# Patient Record
Sex: FEMALE | Race: WHITE | NOT HISPANIC OR LATINO | URBAN - METROPOLITAN AREA
[De-identification: names, ages, dates, MRNs, and addresses within clinical notes are randomized per-mention and may not be internally consistent; named-entity substitution may affect disease eponyms.]

---

## 2022-07-09 ENCOUNTER — TELEPHONE ENCOUNTER (OUTPATIENT)
Dept: URBAN - METROPOLITAN AREA CLINIC 121 | Facility: CLINIC | Age: 72
End: 2022-07-09

## 2022-07-10 ENCOUNTER — TELEPHONE ENCOUNTER (OUTPATIENT)
Dept: URBAN - METROPOLITAN AREA CLINIC 121 | Facility: CLINIC | Age: 72
End: 2022-07-10

## 2022-07-30 ENCOUNTER — TELEPHONE ENCOUNTER (OUTPATIENT)
Age: 72
End: 2022-07-30

## 2022-07-30 RX ORDER — DEXLANSOPRAZOLE 30 MG/1
1 (ONE) CAPSULE, DELAYED RELEASE ORAL DAILY
Qty: 0 | Refills: 2 | OUTPATIENT
Start: 2019-11-14 | End: 2019-12-03

## 2022-07-30 RX ORDER — WHEAT DEXTRIN 3 G/4 G
1 (ONE) POWDER (GRAM) ORAL TWICE DAILY
Qty: 0 | Refills: 2 | OUTPATIENT
Start: 2019-11-07 | End: 2019-11-14

## 2022-07-30 RX ORDER — LORATADINE 5 MG
17 TABLET,CHEWABLE ORAL
Qty: 0 | Refills: 2 | OUTPATIENT
Start: 2019-12-03 | End: 2020-01-02

## 2022-07-30 RX ORDER — LORATADINE 5 MG
17 TABLET,CHEWABLE ORAL
Qty: 0 | Refills: 2 | OUTPATIENT
Start: 2019-11-14 | End: 2019-12-03

## 2022-07-31 ENCOUNTER — TELEPHONE ENCOUNTER (OUTPATIENT)
Age: 72
End: 2022-07-31

## 2022-07-31 RX ORDER — LORATADINE 5 MG
17 TABLET,CHEWABLE ORAL
Qty: 0 | Refills: 2 | Status: ACTIVE | COMMUNITY
Start: 2019-11-14

## 2022-07-31 RX ORDER — DEXLANSOPRAZOLE 30 MG/1
1 (ONE) CAPSULE, DELAYED RELEASE ORAL DAILY
Qty: 0 | Refills: 2 | Status: ACTIVE | COMMUNITY
Start: 2019-11-14

## 2022-07-31 RX ORDER — LORATADINE 5 MG
17 TABLET,CHEWABLE ORAL
Qty: 0 | Refills: 2 | Status: ACTIVE | COMMUNITY
Start: 2019-12-03

## 2022-07-31 RX ORDER — WHEAT DEXTRIN 3 G/4 G
1 (ONE) POWDER (GRAM) ORAL TWICE DAILY
Qty: 0 | Refills: 2 | Status: ACTIVE | COMMUNITY
Start: 2019-11-07

## 2023-10-10 ENCOUNTER — TELEPHONE ENCOUNTER (OUTPATIENT)
Dept: URBAN - METROPOLITAN AREA CLINIC 7 | Facility: CLINIC | Age: 73
End: 2023-10-10

## 2023-10-12 ENCOUNTER — OFFICE VISIT (OUTPATIENT)
Dept: URBAN - METROPOLITAN AREA CLINIC 9 | Facility: CLINIC | Age: 73
End: 2023-10-12
Payer: MEDICARE

## 2023-10-12 VITALS
DIASTOLIC BLOOD PRESSURE: 76 MMHG | WEIGHT: 139 LBS | BODY MASS INDEX: 21.82 KG/M2 | HEIGHT: 67 IN | SYSTOLIC BLOOD PRESSURE: 128 MMHG

## 2023-10-12 DIAGNOSIS — R19.8 CHANGE IN BOWEL MOVEMENT: ICD-10-CM

## 2023-10-12 DIAGNOSIS — R19.5 LOOSE STOOLS: ICD-10-CM

## 2023-10-12 DIAGNOSIS — K44.9 HIATAL HERNIA: ICD-10-CM

## 2023-10-12 DIAGNOSIS — K21.9 CHRONIC GERD: ICD-10-CM

## 2023-10-12 DIAGNOSIS — K76.0 HEPATIC STEATOSIS: ICD-10-CM

## 2023-10-12 PROBLEM — 197321007: Status: ACTIVE | Noted: 2023-10-12

## 2023-10-12 PROBLEM — 88111009: Status: ACTIVE | Noted: 2023-10-12

## 2023-10-12 PROBLEM — 235595009: Status: ACTIVE | Noted: 2023-10-12

## 2023-10-12 PROBLEM — 84089009: Status: ACTIVE | Noted: 2023-10-12

## 2023-10-12 PROBLEM — 398032003: Status: ACTIVE | Noted: 2023-10-12

## 2023-10-12 PROCEDURE — 99204 OFFICE O/P NEW MOD 45 MIN: CPT | Performed by: STUDENT IN AN ORGANIZED HEALTH CARE EDUCATION/TRAINING PROGRAM

## 2023-10-12 RX ORDER — DEXLANSOPRAZOLE 30 MG/1
1 (ONE) CAPSULE, DELAYED RELEASE ORAL DAILY
Qty: 0 | Refills: 2 | Status: DISCONTINUED | COMMUNITY
Start: 2019-11-14

## 2023-10-12 RX ORDER — LORATADINE 5 MG
17 TABLET,CHEWABLE ORAL
Qty: 0 | Refills: 2 | Status: DISCONTINUED | COMMUNITY
Start: 2019-12-03

## 2023-10-12 RX ORDER — LEVOTHYROXINE SODIUM 75 UG/1
1 TABLET IN THE MORNING ON AN EMPTY STOMACH TABLET ORAL ONCE A DAY
Status: ACTIVE | COMMUNITY

## 2023-10-12 RX ORDER — PANTOPRAZOLE SODIUM 40 MG/1
1 TABLET TABLET, DELAYED RELEASE ORAL ONCE A DAY
Qty: 30 | Refills: 3 | OUTPATIENT
Start: 2023-10-12

## 2023-10-12 RX ORDER — WHEAT DEXTRIN 3 G/4 G
1 (ONE) POWDER (GRAM) ORAL TWICE DAILY
Qty: 0 | Refills: 2 | Status: DISCONTINUED | COMMUNITY
Start: 2019-11-07

## 2023-10-12 NOTE — HPI-TODAY'S VISIT:
73 YO Female presents for abdominal pain.   Starting in september had an episode of sudden diarrhea and bloating and cramping.  Intermittently having cramping and alternating bowel movements.     Tried Famotidine and got a bad headache.  Has been having pain after eating and bloating which is painful when she bends over.  Concern for PUD.  Advised for daily bowel regimen.  CTAP with heaptic steatosis and no evidence of inflammation within the GI tract.  GERD LSM reviewed with patient.  Advised patient for trial of PPI daily and daily bowel regimen.  Discussed EGD with patient and patient would like to wait and see if symptoms persist after PPI trial.      ALARM SYMPTOMS --No odynophagia --No hematemesis --No melena / hematochezia --No unintentional weight loss --No iron deficiency anemia  PERTINENT GI FAMILY HISTORY Colon polyps:  no family history Colon cancer:  no family history   GASTROINTESTINAL PROCEDURE HISTORY Colonoscopy:	 --2019 EGD:   --2019  PERTINENT MEDICAL HISTORY Aspirin 81mg PO daily:   --Not Taking Other Blood Thinners:   Cardiac Disease:   --No History Pulmonary Disease --No History Abdominal Surgery & Hernia:  No History

## 2023-10-19 ENCOUNTER — OFFICE VISIT (OUTPATIENT)
Dept: URBAN - METROPOLITAN AREA CLINIC 9 | Facility: CLINIC | Age: 73
End: 2023-10-19
Payer: MEDICARE

## 2023-10-19 ENCOUNTER — TELEPHONE ENCOUNTER (OUTPATIENT)
Dept: URBAN - METROPOLITAN AREA CLINIC 9 | Facility: CLINIC | Age: 73
End: 2023-10-19

## 2023-10-19 VITALS
WEIGHT: 141 LBS | SYSTOLIC BLOOD PRESSURE: 134 MMHG | DIASTOLIC BLOOD PRESSURE: 80 MMHG | BODY MASS INDEX: 22.13 KG/M2 | HEIGHT: 67 IN

## 2023-10-19 DIAGNOSIS — K58.1 IRRITABLE BOWEL SYNDROME WITH CONSTIPATION: ICD-10-CM

## 2023-10-19 DIAGNOSIS — K21.9 GASTRO-ESOPHAGEAL REFLUX DISEASE WITHOUT ESOPHAGITIS: ICD-10-CM

## 2023-10-19 PROCEDURE — 99214 OFFICE O/P EST MOD 30 MIN: CPT | Performed by: INTERNAL MEDICINE

## 2023-10-19 RX ORDER — PANTOPRAZOLE SODIUM 40 MG/1
1 TABLET TABLET, DELAYED RELEASE ORAL ONCE A DAY
OUTPATIENT
Start: 2023-10-12

## 2023-10-19 RX ORDER — WHEAT DEXTRIN 3 G/3.5 G
1 TABLESPOON POWDER (GRAM) ORAL DAILY
Qty: 30 | Refills: 3 | OUTPATIENT
Start: 2023-10-19 | End: 2024-02-15

## 2023-10-19 RX ORDER — LEVOTHYROXINE SODIUM 75 UG/1
1 TABLET IN THE MORNING ON AN EMPTY STOMACH TABLET ORAL ONCE A DAY
Status: ACTIVE | COMMUNITY

## 2023-10-19 RX ORDER — CITALOPRAM 10 MG/1
1 TABLET TABLET, FILM COATED ORAL ONCE A DAY
Status: ACTIVE | COMMUNITY

## 2023-10-19 RX ORDER — POLYETHYLENE GLYCOL 3350 17 G/17G
1 SCOOP MIXED WITH 8 OUNCES OF FLUID POWDER, FOR SOLUTION ORAL ONCE A DAY
OUTPATIENT
Start: 2023-10-19 | End: 2023-11-17

## 2023-10-19 RX ORDER — PANTOPRAZOLE SODIUM 40 MG/1
1 TABLET TABLET, DELAYED RELEASE ORAL ONCE A DAY
Qty: 30 | Refills: 3 | Status: ACTIVE | COMMUNITY
Start: 2023-10-12

## 2023-10-19 NOTE — HPI-TODAY'S VISIT:
Pt here for follow up of abd pain and bowel issues and bloating . 2019 EGD with bx neg 2019 Colon . Pt has started to have a few months of lower abd pain, bowel issues, some diarrhea at times and abd pain.  . 2023 CT a/p with fatty liver but no inflammation 2023 CBC, BMP, lfts, lipase, negative . Pt here for eval. She saw Dr. Dowd and was started on pantoprazole and benefiber and now sx are much improved but not resolved. She is still having constipation despite pm benefiber so I advised we move benefiber to the AM and that we add miralax to her AM routine. I advised we cont PPI and that we rtc in 1 mo for f/u of her IBS sx.  .

## 2023-10-20 ENCOUNTER — WEB ENCOUNTER (OUTPATIENT)
Dept: URBAN - METROPOLITAN AREA CLINIC 9 | Facility: CLINIC | Age: 73
End: 2023-10-20

## 2023-10-26 ENCOUNTER — WEB ENCOUNTER (OUTPATIENT)
Dept: URBAN - METROPOLITAN AREA CLINIC 9 | Facility: CLINIC | Age: 73
End: 2023-10-26

## 2023-12-06 ENCOUNTER — OFFICE VISIT (OUTPATIENT)
Dept: URBAN - METROPOLITAN AREA CLINIC 9 | Facility: CLINIC | Age: 73
End: 2023-12-06
Payer: MEDICARE

## 2023-12-06 ENCOUNTER — DASHBOARD ENCOUNTERS (OUTPATIENT)
Age: 73
End: 2023-12-06

## 2023-12-06 VITALS
SYSTOLIC BLOOD PRESSURE: 122 MMHG | BODY MASS INDEX: 21.66 KG/M2 | WEIGHT: 138 LBS | DIASTOLIC BLOOD PRESSURE: 80 MMHG | HEIGHT: 67 IN

## 2023-12-06 DIAGNOSIS — K58.1 IRRITABLE BOWEL SYNDROME WITH CONSTIPATION: ICD-10-CM

## 2023-12-06 DIAGNOSIS — K21.9 GASTRO-ESOPHAGEAL REFLUX DISEASE WITHOUT ESOPHAGITIS: ICD-10-CM

## 2023-12-06 PROCEDURE — 99214 OFFICE O/P EST MOD 30 MIN: CPT | Performed by: INTERNAL MEDICINE

## 2023-12-06 RX ORDER — PANTOPRAZOLE SODIUM 40 MG/1
1 TABLET TABLET, DELAYED RELEASE ORAL ONCE A DAY
OUTPATIENT
Start: 2023-10-12

## 2023-12-06 RX ORDER — LEVOTHYROXINE SODIUM 75 UG/1
1 TABLET IN THE MORNING ON AN EMPTY STOMACH TABLET ORAL ONCE A DAY
Status: ACTIVE | COMMUNITY

## 2023-12-06 RX ORDER — PANTOPRAZOLE SODIUM 40 MG/1
1 TABLET TABLET, DELAYED RELEASE ORAL ONCE A DAY
Status: ACTIVE | COMMUNITY
Start: 2023-10-12

## 2023-12-06 RX ORDER — CITALOPRAM 10 MG/1
1 TABLET TABLET, FILM COATED ORAL ONCE A DAY
Status: ON HOLD | COMMUNITY

## 2023-12-06 RX ORDER — WHEAT DEXTRIN 3 G/3.5 G
1 TABLESPOON POWDER (GRAM) ORAL DAILY
Qty: 30 | Refills: 3 | Status: ACTIVE | COMMUNITY
Start: 2023-10-19 | End: 2024-02-15

## 2023-12-06 RX ORDER — WHEAT DEXTRIN 3 G/3.5 G
1 TABLESPOON POWDER (GRAM) ORAL DAILY
OUTPATIENT
Start: 2023-10-19

## 2023-12-06 NOTE — HPI-TODAY'S VISIT:
Pt here for f/u of IBS constipation and GERD . 2019 EGD with bx neg 2019 Colon . 2023 CT a/p with fatty liver but no inflammation 2023 CBC, BMP, lfts, lipase, negative . Last visit we moved fiber to the AM, added miralax and moved pantoprazole to the AM . She now is dramatically improved and happy with her symptoms. I advised that we cont the course. She is agreeable. RTC pending the above.  .

## 2024-01-10 ENCOUNTER — ERX REFILL RESPONSE (OUTPATIENT)
Dept: URBAN - METROPOLITAN AREA CLINIC 9 | Facility: CLINIC | Age: 74
End: 2024-01-10

## 2024-01-10 RX ORDER — PANTOPRAZOLE SODIUM 40 MG/1
1 TABLET TABLET, DELAYED RELEASE ORAL ONCE A DAY
Qty: 30 | Refills: 3 | OUTPATIENT

## 2024-01-10 RX ORDER — PANTOPRAZOLE SODIUM 40 MG/1
1 TABLET TABLET, DELAYED RELEASE ORAL ONCE A DAY
OUTPATIENT

## 2024-06-07 ENCOUNTER — OFFICE VISIT (OUTPATIENT)
Dept: URBAN - METROPOLITAN AREA CLINIC 9 | Facility: CLINIC | Age: 74
End: 2024-06-07